# Patient Record
Sex: MALE | Race: WHITE | NOT HISPANIC OR LATINO | URBAN - METROPOLITAN AREA
[De-identification: names, ages, dates, MRNs, and addresses within clinical notes are randomized per-mention and may not be internally consistent; named-entity substitution may affect disease eponyms.]

---

## 2020-08-05 ENCOUNTER — OFFICE VISIT (OUTPATIENT)
Dept: URBAN - METROPOLITAN AREA CLINIC 78 | Facility: CLINIC | Age: 45
End: 2020-08-05
Payer: COMMERCIAL

## 2020-08-05 ENCOUNTER — DASHBOARD ENCOUNTERS (OUTPATIENT)
Age: 45
End: 2020-08-05

## 2020-08-05 DIAGNOSIS — K92.1 BLOOD IN STOOL: ICD-10-CM

## 2020-08-05 DIAGNOSIS — R12 HEARTBURN: ICD-10-CM

## 2020-08-05 PROBLEM — 443381000124105 OBESE CLASS II: Status: ACTIVE | Noted: 2020-08-05

## 2020-08-05 PROCEDURE — 99244 OFF/OP CNSLTJ NEW/EST MOD 40: CPT | Performed by: INTERNAL MEDICINE

## 2020-08-05 PROCEDURE — 99204 OFFICE O/P NEW MOD 45 MIN: CPT | Performed by: INTERNAL MEDICINE

## 2020-08-05 RX ORDER — SODIUM, POTASSIUM,MAG SULFATES 17.5-3.13G
177 ML DAY 1 AND 177 ML DAY 2 SOLUTION, RECONSTITUTED, ORAL ORAL
Qty: 354 MILLILITER | Refills: 0 | OUTPATIENT
Start: 2020-08-05

## 2020-08-05 RX ORDER — MULTIVITAMIN
1 TABLET TABLET ORAL ONCE A DAY
Status: ACTIVE | COMMUNITY

## 2020-08-05 NOTE — PHYSICAL EXAM GASTROINTESTINAL
Abdomen , soft, non-tender, non-distended , no guarding or rigidity , no masses palpable , normal bowel soundsLiver and Spleen OSM limited

## 2020-08-05 NOTE — HPI-TODAY'S VISIT:
Patient postive stool tests at PCP office probably FIT test .  Any Family history of colon polyps or colon cancer or inflammatory bowel disease: Denies NSAIDs use  Routine blood work was fine Denies change in bowels, hard stools or straining Infreq BRBPR on wiping Inconsitent heartburn over years  Denies dysphagia  Triggers are weight gain and food  15 year ago he was on acid reducers   Quit smoking 15 years ago  Drinks  socially

## 2020-09-22 ENCOUNTER — OFFICE VISIT (OUTPATIENT)
Dept: URBAN - METROPOLITAN AREA SURGERY CENTER 15 | Facility: SURGERY CENTER | Age: 45
End: 2020-09-22

## 2020-09-25 ENCOUNTER — OFFICE VISIT (OUTPATIENT)
Dept: URBAN - METROPOLITAN AREA SURGERY CENTER 15 | Facility: SURGERY CENTER | Age: 45
End: 2020-09-25
Payer: COMMERCIAL

## 2020-09-25 ENCOUNTER — CLAIMS CREATED FROM THE CLAIM WINDOW (OUTPATIENT)
Dept: URBAN - METROPOLITAN AREA CLINIC 4 | Facility: CLINIC | Age: 45
End: 2020-09-25
Payer: COMMERCIAL

## 2020-09-25 DIAGNOSIS — R19.5 ABNORMAL FECES: ICD-10-CM

## 2020-09-25 DIAGNOSIS — K63.5 BENIGN COLON POLYP: ICD-10-CM

## 2020-09-25 DIAGNOSIS — D12.5 BENIGN NEOPLASM OF SIGMOID COLON: ICD-10-CM

## 2020-09-25 PROCEDURE — 88305 TISSUE EXAM BY PATHOLOGIST: CPT | Performed by: PATHOLOGY

## 2020-09-25 PROCEDURE — 45385 COLONOSCOPY W/LESION REMOVAL: CPT | Performed by: INTERNAL MEDICINE

## 2020-09-25 PROCEDURE — G9937 DIG OR SURV COLSCO: HCPCS | Performed by: INTERNAL MEDICINE

## 2023-11-07 NOTE — PHYSICAL EXAM HENT:
Head,  normocephalic,  atraumatic,  Face,  Face within normal limits,  Ears,  External ears within normal limits,  Nose/Nasopharynx,  External nose  normal appearance Mouth and Throat,  Oral cavity appearance normal Lips,  Appearance normal
Patient

## 2024-07-18 ENCOUNTER — LAB OUTSIDE AN ENCOUNTER (OUTPATIENT)
Dept: URBAN - METROPOLITAN AREA SURGERY CENTER 15 | Facility: SURGERY CENTER | Age: 49
End: 2024-07-18

## 2024-07-23 ENCOUNTER — CLAIMS CREATED FROM THE CLAIM WINDOW (OUTPATIENT)
Dept: URBAN - METROPOLITAN AREA SURGERY CENTER 15 | Facility: SURGERY CENTER | Age: 49
End: 2024-07-23
Payer: COMMERCIAL

## 2024-07-23 DIAGNOSIS — K63.5 BENIGN COLON POLYP: ICD-10-CM

## 2024-07-23 DIAGNOSIS — K62.1 ANAL AND RECTAL POLYP: ICD-10-CM

## 2024-07-23 DIAGNOSIS — K63.5 HYPERPLASTIC POLYP OF SIGMOID COLON: ICD-10-CM

## 2024-07-23 DIAGNOSIS — Z12.11 COLON CANCER SCREENING: ICD-10-CM

## 2024-07-23 DIAGNOSIS — K62.1 HYPERPLASTIC RECTAL POLYP: ICD-10-CM

## 2024-07-23 PROCEDURE — 00812 ANES LWR INTST SCR COLSC: CPT | Performed by: NURSE ANESTHETIST, CERTIFIED REGISTERED

## 2024-07-23 PROCEDURE — 45380 COLONOSCOPY AND BIOPSY: CPT | Performed by: INTERNAL MEDICINE

## 2024-07-23 PROCEDURE — 45385 COLONOSCOPY W/LESION REMOVAL: CPT | Performed by: INTERNAL MEDICINE

## 2025-06-16 ENCOUNTER — WEB ENCOUNTER (OUTPATIENT)
Dept: URBAN - METROPOLITAN AREA CLINIC 78 | Facility: CLINIC | Age: 50
End: 2025-06-16

## 2025-06-30 ENCOUNTER — OFFICE VISIT (OUTPATIENT)
Dept: URBAN - METROPOLITAN AREA CLINIC 78 | Facility: CLINIC | Age: 50
End: 2025-06-30

## 2025-06-30 ENCOUNTER — LAB OUTSIDE AN ENCOUNTER (OUTPATIENT)
Dept: URBAN - METROPOLITAN AREA CLINIC 78 | Facility: CLINIC | Age: 50
End: 2025-06-30

## 2025-06-30 PROBLEM — 78275009: Status: ACTIVE | Noted: 2025-06-30

## 2025-06-30 PROBLEM — 162864005: Status: ACTIVE | Noted: 2025-06-30

## 2025-06-30 PROBLEM — 253794002: Status: ACTIVE | Noted: 2025-06-30

## 2025-06-30 RX ORDER — ROSUVASTATIN CALCIUM 40 MG/1
1 TABLET TABLET, COATED ORAL ONCE A DAY
Qty: 30 | Status: ACTIVE | COMMUNITY

## 2025-06-30 RX ORDER — MULTIVITAMIN
1 TABLET TABLET ORAL ONCE A DAY
Status: ACTIVE | COMMUNITY

## 2025-06-30 RX ORDER — OLMESARTAN MEDOXOMIL AND HYDROCHLOROTHIAZIDE 40; 25 MG/1; MG/1
TAKE 1 TABLET DAILY TABLET, FILM COATED ORAL
Qty: 90 EACH | Refills: 1 | Status: ACTIVE | COMMUNITY

## 2025-06-30 RX ORDER — SODIUM, POTASSIUM,MAG SULFATES 17.5-3.13G
177 ML DAY 1 AND 177 ML DAY 2 SOLUTION, RECONSTITUTED, ORAL ORAL
Qty: 354 MILLILITER | Refills: 0 | Status: ON HOLD | COMMUNITY
Start: 2020-08-05

## 2025-06-30 NOTE — PHYSICAL EXAM GASTROINTESTINAL
Abdomen , soft, nontender, nondistended , no guarding or rigidity , no masses palpable , normal bowel sounds , Liver and Spleen Central obesity

## 2025-06-30 NOTE — HPI-TODAY'S VISIT:
Raleigh Estrada, a 50-year-old male, presented for a chronic condition follow-up focused on his recent incomplete colonoscopy ( given aspiration risk and transient hypoxia , we opted to proceed with colonoscopy unsedated but the Cecal intubation was aborted after a fair trail and need to avoid abdominal pressure ) management of sleep apnea, reflux, and weight concerns. He described that his colonoscopy last summer was not completed because he awoke during the procedure, leading to airway concerns and the recommendation to reschedule the exam in a hospital setting. He attempted to arrange a repeat colonoscopy this summer but did not complete the scheduling process. Raleigh recalled a prior colonoscopy in September 2020, which was fully completed and revealed only a benign hyperplastic polyp. He expressed concern about the best approach for rescheduling and the risks associated with his redundant (loopy) colon, noting that his father may have had polyps but there is no family history of colon cancer.  In the context of his sleep apnea, Raleigh reported starting CPAP therapy in January, using a machine obtained from his mother-in-law. He noted that the device is not monitored by his provider or insurance, and he is self-monitoring his events per night, aiming for fewer than five. While sometimes his events are slightly above this goal, he generally uses the CPAP every night, though he remains uncertain about the effectiveness and tracking of his therapy. He acknowledged that sleep apnea is a risk factor for airway complications during procedures.  Regarding gastroesophageal reflux, Raleigh stated that he experiences heartburn and reflux symptoms, which are well controlled with regular Pepcid use. He observed that his symptoms worsen when he misses a dose, but otherwise, his reflux is manageable.  Finally, Raleigh and I discussed his weight, noting that his BMI has increased since 2020. I recommended a 25-pound weight loss, representing about 10% of his body weight, to help reduce procedural and airway risks. Raleigh recognized the importance of weight loss, understanding its impact on his sleep apnea and overall health.   Colonoscopy 9/25/2020: Done for fit test positive Revealed internal hemorrhoids redundant sigmoid colon 5 mm polyp and few small diverticula Path was hyperplastic   Colonoscopy done 7/23/2024: One 8 mm polyp in sigmoid colon hyperplastic on pathology and 2 diminutive polyps in the rectum hyperplastic pathology the cecal intubation was limited the scope was advanced to the right side by confirmation of the IC valve.  Procedure was deemed difficult technically especially limited by application of abdominal pressure given airway was a concern.  As noted given an episode of desaturation the CRNA opted to proceed with the procedure unsedated

## 2025-07-02 ENCOUNTER — TELEPHONE ENCOUNTER (OUTPATIENT)
Dept: URBAN - METROPOLITAN AREA CLINIC 78 | Facility: CLINIC | Age: 50
End: 2025-07-02

## 2025-07-22 ENCOUNTER — OFFICE VISIT (OUTPATIENT)
Dept: URBAN - METROPOLITAN AREA CLINIC 77 | Facility: CLINIC | Age: 50
End: 2025-07-22
Payer: COMMERCIAL

## 2025-07-22 DIAGNOSIS — K76.0 HEPATIC STEATOSIS: ICD-10-CM

## 2025-07-22 PROCEDURE — 76700 US EXAM ABDOM COMPLETE: CPT | Performed by: INTERNAL MEDICINE
